# Patient Record
Sex: MALE | Race: OTHER
[De-identification: names, ages, dates, MRNs, and addresses within clinical notes are randomized per-mention and may not be internally consistent; named-entity substitution may affect disease eponyms.]

---

## 2019-06-06 ENCOUNTER — HOSPITAL ENCOUNTER (EMERGENCY)
Dept: HOSPITAL 56 - MW.ED | Age: 29
Discharge: HOME | End: 2019-06-06
Payer: SELF-PAY

## 2019-06-06 DIAGNOSIS — Z13.9: Primary | ICD-10-CM

## 2019-07-10 ENCOUNTER — HOSPITAL ENCOUNTER (EMERGENCY)
Dept: HOSPITAL 56 - MW.ED | Age: 29
Discharge: PSYCHIATRIC OR SUBSTANCE USE FACILITY | End: 2019-07-10
Payer: OTHER GOVERNMENT

## 2019-07-10 DIAGNOSIS — F17.210: ICD-10-CM

## 2019-07-10 DIAGNOSIS — F31.9: ICD-10-CM

## 2019-07-10 DIAGNOSIS — Z79.899: ICD-10-CM

## 2019-07-10 DIAGNOSIS — F41.9: ICD-10-CM

## 2019-07-10 DIAGNOSIS — F39: ICD-10-CM

## 2019-07-10 DIAGNOSIS — R45.851: Primary | ICD-10-CM

## 2019-07-10 LAB
APAP SERPL-MCNC: <2 UG/ML
CHLORIDE SERPL-SCNC: 107 MMOL/L (ref 98–107)
SODIUM SERPL-SCNC: 143 MMOL/L (ref 136–148)

## 2019-07-10 PROCEDURE — 80305 DRUG TEST PRSMV DIR OPT OBS: CPT

## 2019-07-10 PROCEDURE — G0480 DRUG TEST DEF 1-7 CLASSES: HCPCS

## 2019-07-10 PROCEDURE — 36415 COLL VENOUS BLD VENIPUNCTURE: CPT

## 2019-07-10 PROCEDURE — 99284 EMERGENCY DEPT VISIT MOD MDM: CPT

## 2019-07-10 PROCEDURE — 81001 URINALYSIS AUTO W/SCOPE: CPT

## 2019-07-10 PROCEDURE — 80053 COMPREHEN METABOLIC PANEL: CPT

## 2019-07-10 PROCEDURE — 84443 ASSAY THYROID STIM HORMONE: CPT

## 2019-07-10 PROCEDURE — 85025 COMPLETE CBC W/AUTO DIFF WBC: CPT

## 2019-07-10 NOTE — EDM.PDOC
ED HPI GENERAL MEDICAL PROBLEM





- General


Chief Complaint: General


Stated Complaint: MEDICAL CLEARANCE


Time Seen by Provider: 07/10/19 13:51


Source of Information: Reports: Patient, Police


History Limitations: Reports: No Limitations





- History of Present Illness


INITIAL COMMENTS - FREE TEXT/NARRATIVE: 


HISTORY AND PHYSICAL:





History of present illness:


Patient is a 29-year-old male who is brought to the emergency room by law 

enforcement for medical clearance and mental health evaluation. Law enforcement 

apprehended patient after assaulting his wife. Patient reported that he planned 

to kill himself. Patient continues to have suicidal ideations, reports these 

have been ongoing intermittently over the past several years. States he has a 

history of anxiety, depression, suicidal ideation with last mental health 

evaluation being in 2014. He currently does see a primary care provider who has 

prescribed him hydralazine and duloxetine. States he has had some tele-psych 

evaluations for medication management.





Patient denies any fever, chills, headache, change in vision, syncope or near 

syncope. Denies any chest pain, back pain, shortness of breath or cough. Denies 

any abdominal pain, nausea, vomiting, diarrhea, constipation or dysuria. 

Patient has been eating and drinking appropriately. Denies any recent drug or 

alcohol abuse.





Review of systems: 


As per history of present illness and below otherwise all systems reviewed and 

negative.





Past medical history: 


As per history of present illness and as reviewed below otherwise 

noncontributory.





Surgical history: 


As per history of present illness and as reviewed below otherwise 

noncontributory.





Social history: 


See social history for further information





Family history: 


As per history of present illness and as reviewed below otherwise 

noncontributory.





Physical exam:


General: Well-developed and well-nourished 29-year-old male. Alert and 

oriented. Flat and nontoxic appearing and in no acute distress.


HEENT: Atraumatic, normocephalic, pupils equal and reactive bilaterally, 

negative for conjunctival pallor or scleral icterus, mucous membranes moist, 

trachea midline. No drooling or trismus noted. No meningeal signs. No hot 

potato voice noted. 


Lungs: Clear to auscultation, breath sounds equal bilaterally, chest nontender.


Heart: S1S2, regular rate and rhythm without overt murmur


Abdomen: Soft, nondistended, nontender. Negative for masses. Negative for 

costovertebral tenderness. Pelvis is stable nontender.


Skin: Intact, warm, dry. No lesions or rashes noted.


Extremities: Atraumatic, moves all extremities per self without difficulty or 

deficits, negative for cords or calf pain. Neurovascular unremarkable.


Neuro: Awake, alert, oriented. Cranial nerves II through XII unremarkable. 

Cerebellum unremarkable. Motor and sensory unremarkable throughout. Exam 

nonfocal.





Notes:


Enforcement states they're going to release him from their custody and would 

like our ambulance crew to transport this patient.


A mental health emergency admission form was completed on this patient. Lab 

work is still pending at this time. Vital signs are stable. Physical 

examination is unremarkable. 


1400: [facility] was contacted, no beds available at this time.


14:15[facility] was contacted, Dr. Fatima, is agreeable to 

accepting this patient. He will be a direct admission.





Diagnostics:


CBC, CMP, UA, TSH, Acetaminophen, Salicylate, Drug Screen, ETOH





Therapeutics:


Ativan PO





Impression: 


Mood Disorder, unspecified


Suicidal ideation





Plan:


Transfer to [facility] for mental health evaluation. Going via 

ground EMS





Definitive disposition and diagnosis as appropriate pending reevaluation and 

review of above.





  ** Right Shoulder


Pain Score (Numeric/FACES): 8





- Related Data


 Allergies











Allergy/AdvReac Type Severity Reaction Status Date / Time


 


No Known Allergies Allergy   Verified 07/10/19 13:48











Home Meds: 


 Home Meds





FLUoxetine HCl [Fluoxetine] 20 mg PO DAILY 06/06/19 [History]


hydrOXYzine HCl [hydrOXYzine] 1 tab PO TID PRN 06/06/19 [History]











Past Medical History


HEENT History: Reports: Cataract


Psychiatric History: Reports: Anxiety, Bipolar, Depression, Mood Swings, OCD, 

PTSD





- Infectious Disease History


Infectious Disease History: Reports: Chicken Pox





- Past Surgical History


GI Surgical History: Reports: Appendectomy


Musculoskeletal Surgical History: Reports: Other (See Below)


Other Musculoskeletal Surgeries/Procedures:: Right Ankle





Social & Family History





- Family History


Cardiac: Reports: CAD, Hypertension


Neurological: Reports: CVA





- Tobacco Use


Smoking Status *Q: Current Every Day Smoker


Years of Tobacco use: 10


Packs/Tins Daily: 1





- Caffeine Use


Caffeine Use: Reports: Coffee, Energy Drinks, Soda, Tea





- Recreational Drug Use


Recreational Drug Use: No





ED ROS GENERAL





- Review of Systems


Review Of Systems: ROS reveals no pertinent complaints other than HPI.





ED EXAM, GENERAL





- Physical Exam


Exam: See Below (See dictation)





Course





- Vital Signs


Last Recorded V/S: 





 Last Vital Signs











Temp      


 


Pulse  66   07/10/19 13:48


 


Resp  17   07/10/19 13:48


 


BP  133/70   07/10/19 13:48


 


Pulse Ox  96   07/10/19 13:48














- Orders/Labs/Meds


Orders: 





 Active Orders 24 hr











 Category Date Time Status


 


 ACETAMINOPHEN [CHEM] Stat Lab  07/10/19 14:23 Received


 


 COMPREHENSIVE METABOLIC PN,CMP [CHEM] Stat Lab  07/10/19 14:23 Received


 


 ETHANOL BLOOD MEDICAL [CHEM] Stat Lab  07/10/19 14:23 Received


 


 SALICYLATE [CHEM] Stat Lab  07/10/19 14:23 Received


 


 TSH [CHEM] Stat Lab  07/10/19 14:23 Received


 


 UA RFX RAJIV AND CULT IF INDIC [URIN] Stat Lab  07/10/19 14:22 Received











Labs: 





 Laboratory Tests











  07/10/19 07/10/19 Range/Units





  14:22 14:23 


 


WBC   5.91  (4.0-11.0)  K/uL


 


RBC   4.81  (4.50-5.90)  M/uL


 


Hgb   13.8  (13.0-17.0)  g/dL


 


Hct   41.1  (38.0-50.0)  %


 


MCV   85.4  (80.0-98.0)  fL


 


MCH   28.7  (27.0-32.0)  pg


 


MCHC   33.6  (31.0-37.0)  g/dL


 


RDW Std Deviation   42.6  (28.0-62.0)  fl


 


RDW Coeff of Gómez   14  (11.0-15.0)  %


 


Plt Count   177  (150-400)  K/uL


 


MPV   11.90  (7.40-12.00)  fL


 


Neut % (Auto)   44.7 L  (48.0-80.0)  %


 


Lymph % (Auto)   43.3 H  (16.0-40.0)  %


 


Mono % (Auto)   7.1  (0.0-15.0)  %


 


Eos % (Auto)   4.4  (0.0-7.0)  %


 


Baso % (Auto)   0.5  (0.0-1.5)  %


 


Neut # (Auto)   2.6  (1.4-5.7)  K/uL


 


Lymph # (Auto)   2.6 H  (0.6-2.4)  K/uL


 


Mono # (Auto)   0.4  (0.0-0.8)  K/uL


 


Eos # (Auto)   0.3  (0.0-0.7)  K/uL


 


Baso # (Auto)   0.0  (0.0-0.1)  K/uL


 


Nucleated RBC %   0.0  /100WBC


 


Nucleated RBCs #   0  K/uL


 


Urine Opiates Screen  NEGATIVE   (NEGATIVE)  


 


Ur Oxycodone Screen  NEGATIVE   (NEGATIVE)  


 


Urine Methadone Screen  NEGATIVE   (NEGATIVE)  


 


Ur Barbiturates Screen  NEGATIVE   (NEGATIVE)  


 


Ur Phencyclidine Scrn  NEGATIVE   (NEGATIVE)  


 


Ur Amphetamine Screen  NEGATIVE   (NEGATIVE)  


 


U Methamphetamines Scrn  NEGATIVE   (NEGATIVE)  


 


U Benzodiazepines Scrn  NEGATIVE   (NEGATIVE)  


 


U Cocaine Metab Screen  NEGATIVE   (NEGATIVE)  


 


U Marijuana (THC) Screen  NEGATIVE   (NEGATIVE)  











Meds: 





Medications














Discontinued Medications














Generic Name Dose Route Start Last Admin





  Trade Name Freq  PRN Reason Stop Dose Admin


 


Lorazepam  1 mg  07/10/19 14:26  07/10/19 14:32





  Ativan  PO  07/10/19 14:27  1 mg





  ONETIME ONE   Administration





     





     





     





     


 


Lorazepam  1 mg  07/10/19 14:27  





  Ativan  PO  07/10/19 14:28  





  ONETIME ONE   





     





     





     





     














Departure





- Departure


Time of Disposition: 14:54


Disposition: DC/Tfer to [facility]


Clinical Impression: 


 Suicidal ideation, Mood disorder








- Discharge Information


Referrals: 


PCP,None [Primary Care Provider] - 





- My Orders


Last 24 Hours: 





My Active Orders





07/10/19 14:22


UA RFX RAJIV AND CULT IF INDIC [URIN] Stat 





07/10/19 14:23


ACETAMINOPHEN [CHEM] Stat 


COMPREHENSIVE METABOLIC PN,CMP [CHEM] Stat 


ETHANOL BLOOD MEDICAL [CHEM] Stat 


SALICYLATE [CHEM] Stat 


TSH [CHEM] Stat 














- Assessment/Plan


Last 24 Hours: 





My Active Orders





07/10/19 14:22


UA RFX RAJIV AND CULT IF INDIC [URIN] Stat 





07/10/19 14:23


ACETAMINOPHEN [CHEM] Stat 


COMPREHENSIVE METABOLIC PN,CMP [CHEM] Stat 


ETHANOL BLOOD MEDICAL [CHEM] Stat 


SALICYLATE [CHEM] Stat 


TSH [CHEM] Stat

## 2020-08-17 NOTE — EDM.PDOC
ED HPI GENERAL MEDICAL PROBLEM





- General


Chief Complaint: ENT Problem


Stated Complaint: FLU SYMPTOMS


Time Seen by Provider: 08/17/20 15:45


Source of Information: Reports: Patient


History Limitations: Reports: No Limitations





- History of Present Illness


INITIAL COMMENTS - FREE TEXT/NARRATIVE: 


HISTORY AND PHYSICAL:





History of present illness:


Patient is a 30-year-old male who presents to the emergency room with complaints

of shortness of breath, cough and body aches for the past 4 days.  He states he 

works around a lot of people and frequently is around persons who appear ill.  

He states he had a temperature of 100 degrees last evening, has been trying to 

increase his fluids take vitamin C, and alternate Tylenol and ibuprofen.  

Patient denies any headache, change in vision, syncope or near syncope. Denies 

any chest pain, back pain, abdominal pain, nausea, vomiting, diarrhea, 

constipation or dysuria. Patient has been eating and drinking appropriately.





Review of systems: 


As per history of present illness and below otherwise all systems reviewed and 

negative.





Past medical history: 


As per history of present illness and as reviewed below otherwise 

noncontributory.





Surgical history: 


As per history of present illness and as reviewed below otherwise 

noncontributory.





Social history: 


See social history for further information





Family history: 


As per history of present illness and as reviewed below otherwise 

noncontributory.





Physical exam:


General: Well developed and well nourished. Alert and orientated x 3. Nontoxic 

in appearance and in no acute distress. Vital signs are stable and have been 

reviewed by me. Nursing notes were reviewed. 


HEENT: Atraumatic, normocephalic, pupils equal and reactive bilaterally, 

negative for conjunctival pallor or scleral icterus, mucous membranes moist, TMs

normal bilaterally, throat clear, neck supple, nontender, trachea midline. No 

drooling or trismus noted. No meningeal signs. No hot potato voice noted. 


Lungs: Faint expiratory wheezing noted to posterior bases bilaterally otherwise 

clear to auscultation, breath sounds equal bilaterally, chest nontender. Normal 

work of breathing, no accessory muscles used.


Heart: S1S2, regular rate and rhythm without overt murmur


Abdomen: Soft, nondistended, nontender.


Skin: Intact, warm, dry. No lesions or rashes noted.


Hematologic: No petechiae or purpra. Mucosa appropriate color and normal nail 

bed color and refill.


Extremities: Atraumatic, moves all extremities per self without difficulty or 

deficits, negative for cords or calf pain. Neurovascular unremarkable.


Neuro: Awake, alert, oriented. Cranial nerves II through XII unremarkable. 

Cerebellum unremarkable. Motor and sensory unremarkable throughout. Exam 

nonfocal.


Psychiatric: Mood and affect are appropriate.  Normal thought process. Answering

questions appropriately.





Differential diagnosis includes but is not limited to: COVID-19, respiratory 

illness, pneumonia, viral illness.





Notes:


Negative COVID and chest x-ray.  We discussed signs and symptoms that would 

prompt them to return to the Emergency Department. Medication, follow up and 

supportive care measures were reviewed and discussed. Voices understanding and 

is agreeable to plan of care. Denies any further questions or concerns at this 

time.





Diagnostics:


COVID-19, chest x-ray





Therapeutics:


None





Prescription:


Medrol Dosepak





Impression: 


Viral upper respiratory illness





Plan:


1. Today your physical exam was normal.  Chest x-ray does not show any sign of 

infection.  COVID-19 screening is negative. We always encourage you to follow up

with your primary care provider or recommended specialist in the next few days 

for re-evaluation and further care/management. If your symptoms should worsen, 

new symptoms develop or any of the signs and symptoms we discussed should arise 

please return to the emergency room or call 911 (if needed).


2. You Can alternate Tylenol and/or ibuprofen as needed for pain management.  


3.  Medrol Dosepak as directed.








Definitive disposition and diagnosis as appropriate pending reevaluation and 

review of above.





  ** head/ENT


Pain Score (Numeric/FACES): 6





- Related Data


                                    Allergies











Allergy/AdvReac Type Severity Reaction Status Date / Time


 


No Known Allergies Allergy   Verified 08/17/20 15:52











Home Meds: 


                                    Home Meds





hydrOXYzine HCL [hydrOXYzine] 1 tab PO TID PRN 06/06/19 [History]


Ibuprofen 800 mg PO ASDIRECTED PRN 08/17/20 [History]


methylPREDNISolone [Medrol] 1 dose PO DAILY 6 Days #1 dospk 08/17/20 [Rx]











Past Medical History


HEENT History: Reports: Cataract


Psychiatric History: Reports: Anxiety, Bipolar, Depression, Mood Swings, OCD, P

TSD





- Infectious Disease History


Infectious Disease History: Reports: Chicken Pox





- Past Surgical History


GI Surgical History: Reports: Appendectomy


Musculoskeletal Surgical History: Reports: Other (See Below)


Other Musculoskeletal Surgeries/Procedures:: Right Ankle





Social & Family History





- Family History


Cardiac: Reports: CAD, Hypertension


Neurological: Reports: CVA





- Tobacco Use


Smoking Status *Q: Former Smoker


Used Tobacco, but Quit: Yes


Month/Year Tobacco Last Used: 2020





- Caffeine Use


Caffeine Use: Reports: Coffee, Energy Drinks, Soda, Tea





- Recreational Drug Use


Recreational Drug Use: No





ED ROS ENT





- Review of Systems


Review Of Systems: Comprehensive ROS is negative, except as noted in HPI.





ED EXAM, ENT





- Physical Exam


Exam: See Below (See dictation)





Course





- Vital Signs


Last Recorded V/S: 


                                Last Vital Signs











Temp  97.6 F   08/17/20 17:36


 


Pulse  67   08/17/20 17:36


 


Resp  16   08/17/20 17:36


 


BP  118/55 L  08/17/20 17:36


 


Pulse Ox  97   08/17/20 17:36














- Orders/Labs/Meds


Labs: 


                                Laboratory Tests











  08/17/20 Range/Units





  16:50 


 


COVID-19 (JOSE)  NEGATIVE  (NEGATIVE)  














Departure





- Departure


Time of Disposition: 17:23


Disposition: Home, Self-Care 01


Clinical Impression: 


 Viral upper respiratory illness








- Discharge Information


Prescriptions: 


methylPREDNISolone [Medrol] 1 dose PO DAILY 6 Days #1 dospk


Instructions:  Viral Respiratory Infection, Easy-To-Read


Referrals: 


PCP,None [Primary Care Provider] - 


Forms:  ED Department Discharge


Additional Instructions: 


The following information is given to patients seen in the emergency department 

who are being discharged to home. This information is to outline your options 

for follow-up care. We provide all patients seen in our emergency department 

with a follow-up referral.





The need for follow-up, as well as the timing and circumstances, are variable 

depending upon the specifics of your emergency department visit.





If you don't have a primary care physician on staff, we will provide you with a 

referral. We always advise you to contact your personal physician following an 

emergency department visit to inform them of the circumstance of the visit and 

for follow-up with them and/or the need for any referrals to a consulting 

specialist.





The emergency department will also refer you to a specialist when appropriate. 

This referral assures that you have the opportunity for follow-up care with a 

specialist. All of these measure are taken in an effort to provide you with 

optimal care, which includes your follow-up.





Under all circumstances we always encourage you to contact your private 

physician who remains a resource for coordinating your care. When calling for 

follow-up care, please make the office aware that this follow-up is from your 

recent emergency room visit. If for any reason you are refused follow-up, please

contact the [facility] Emergency Department

at [phone] and asked to speak to the emergency department charge nurse.





[facility]


Primary Care


[address] [place] [address]


Phone: [phone]


Fax: [phone]





[facility]


[address] [place] [address]


Phone: [phone]


Fax: [phone]





Thank you for choosing the [facility] emergency department in 

[place] for your medical needs today.  It was a pleasure caring for you. Today

you were seen in the emergency department for shortness of breathe and cough.





1. Today your physical exam was normal.  Chest x-ray does not show any sign of 

infection/pneumonia.  COVID-19 screening is negative. We always encourage you to

follow up with your primary care provider or recommended specialist in the next 

few days for re-evaluation and further care/management. If your symptoms should 

worsen, new symptoms develop or any of the signs and symptoms we discussed 

should arise please return to the emergency room or call 911 (if needed).


2. You Can alternate Tylenol and/or ibuprofen as needed for pain management.  


3.  Medrol Dosepak as directed.





Sepsis Event Note (ED)





- Evaluation


Sepsis Screening Result: No Definite Risk

## 2020-08-17 NOTE — CR
Chest: Frontal view of the chest was obtained.

 

Comparison: No prior chest x-ray.

 

Heart size and mediastinum are normal.  Lungs are clear with no acute 

parenchymal change.  Bony structures are grossly intact.

 

Impression:

1.  Nothing acute is appreciated on frontal chest x-ray.

 

Diagnostic code #1

 

This report was dictated in MDT